# Patient Record
Sex: FEMALE | Race: OTHER | HISPANIC OR LATINO
[De-identification: names, ages, dates, MRNs, and addresses within clinical notes are randomized per-mention and may not be internally consistent; named-entity substitution may affect disease eponyms.]

---

## 2018-03-05 PROBLEM — Z00.00 ENCOUNTER FOR PREVENTIVE HEALTH EXAMINATION: Status: ACTIVE | Noted: 2018-03-05

## 2018-03-08 ENCOUNTER — APPOINTMENT (OUTPATIENT)
Dept: HEART AND VASCULAR | Facility: CLINIC | Age: 38
End: 2018-03-08
Payer: COMMERCIAL

## 2018-03-08 ENCOUNTER — APPOINTMENT (OUTPATIENT)
Dept: HEART AND VASCULAR | Facility: CLINIC | Age: 38
End: 2018-03-08

## 2018-03-08 VITALS
DIASTOLIC BLOOD PRESSURE: 60 MMHG | SYSTOLIC BLOOD PRESSURE: 118 MMHG | BODY MASS INDEX: 22.28 KG/M2 | HEART RATE: 82 BPM | WEIGHT: 147 LBS | HEIGHT: 68 IN

## 2018-03-08 DIAGNOSIS — Z34.90 ENCOUNTER FOR SUPERVISION OF NORMAL PREGNANCY, UNSPECIFIED, UNSPECIFIED TRIMESTER: ICD-10-CM

## 2018-03-08 DIAGNOSIS — R07.9 CHEST PAIN, UNSPECIFIED: ICD-10-CM

## 2018-03-08 PROCEDURE — 99205 OFFICE O/P NEW HI 60 MIN: CPT | Mod: 25

## 2018-03-08 PROCEDURE — 93000 ELECTROCARDIOGRAM COMPLETE: CPT

## 2018-03-08 PROCEDURE — 36415 COLL VENOUS BLD VENIPUNCTURE: CPT

## 2018-03-08 RX ORDER — PNV/FERROUS SULFATE/FOLIC ACID 27-<0.5MG
TABLET ORAL
Refills: 0 | Status: ACTIVE | COMMUNITY

## 2018-03-09 ENCOUNTER — APPOINTMENT (OUTPATIENT)
Dept: HEART AND VASCULAR | Facility: CLINIC | Age: 38
End: 2018-03-09
Payer: COMMERCIAL

## 2018-03-09 PROCEDURE — 93306 TTE W/DOPPLER COMPLETE: CPT

## 2018-03-12 LAB
ALBUMIN SERPL ELPH-MCNC: 4 G/DL
ALP BLD-CCNC: 50 U/L
ALT SERPL-CCNC: 13 U/L
ANION GAP SERPL CALC-SCNC: 17 MMOL/L
AST SERPL-CCNC: 19 U/L
BASOPHILS # BLD AUTO: 0.02 K/UL
BASOPHILS NFR BLD AUTO: 0.3 %
BILIRUB SERPL-MCNC: <0.2 MG/DL
BUN SERPL-MCNC: 7 MG/DL
CALCIUM SERPL-MCNC: 9.6 MG/DL
CHLORIDE SERPL-SCNC: 101 MMOL/L
CK MB BLD-MCNC: 0.5 NG/ML
CK MB CFR SERPL: 1 %
CK SERPL-CCNC: 50 U/L
CO2 SERPL-SCNC: 20 MMOL/L
CREAT SERPL-MCNC: 0.57 MG/DL
DEPRECATED D DIMER PPP IA-ACNC: 281 NG/ML DDU
EOSINOPHIL # BLD AUTO: 0.14 K/UL
EOSINOPHIL NFR BLD AUTO: 1.9 %
GLUCOSE SERPL-MCNC: 80 MG/DL
HCT VFR BLD CALC: 34 %
HGB BLD-MCNC: 11.6 G/DL
IMM GRANULOCYTES NFR BLD AUTO: 0.3 %
LYMPHOCYTES # BLD AUTO: 1.84 K/UL
LYMPHOCYTES NFR BLD AUTO: 25.2 %
MAN DIFF?: NORMAL
MCHC RBC-ENTMCNC: 31.9 PG
MCHC RBC-ENTMCNC: 34.1 GM/DL
MCV RBC AUTO: 93.4 FL
MONOCYTES # BLD AUTO: 0.63 K/UL
MONOCYTES NFR BLD AUTO: 8.6 %
NEUTROPHILS # BLD AUTO: 4.64 K/UL
NEUTROPHILS NFR BLD AUTO: 63.7 %
PLATELET # BLD AUTO: 202 K/UL
POTASSIUM SERPL-SCNC: 4.1 MMOL/L
PROT SERPL-MCNC: 7.3 G/DL
RBC # BLD: 3.64 M/UL
RBC # FLD: 13.9 %
SODIUM SERPL-SCNC: 138 MMOL/L
TSH SERPL-ACNC: 1.1 UIU/ML
WBC # FLD AUTO: 7.29 K/UL

## 2018-04-06 VITALS
RESPIRATION RATE: 16 BRPM | DIASTOLIC BLOOD PRESSURE: 65 MMHG | TEMPERATURE: 98 F | HEART RATE: 90 BPM | WEIGHT: 154.98 LBS | SYSTOLIC BLOOD PRESSURE: 106 MMHG | OXYGEN SATURATION: 99 %

## 2018-04-06 LAB
ALBUMIN SERPL ELPH-MCNC: 3.7 G/DL — SIGNIFICANT CHANGE UP (ref 3.3–5)
ALP SERPL-CCNC: 65 U/L — SIGNIFICANT CHANGE UP (ref 40–120)
ALT FLD-CCNC: 15 U/L — SIGNIFICANT CHANGE UP (ref 10–45)
ANION GAP SERPL CALC-SCNC: 10 MMOL/L — SIGNIFICANT CHANGE UP (ref 5–17)
APTT BLD: 28.4 SEC — SIGNIFICANT CHANGE UP (ref 27.5–37.4)
AST SERPL-CCNC: 20 U/L — SIGNIFICANT CHANGE UP (ref 10–40)
BASOPHILS NFR BLD AUTO: 0.2 % — SIGNIFICANT CHANGE UP (ref 0–2)
BILIRUB SERPL-MCNC: 0.2 MG/DL — SIGNIFICANT CHANGE UP (ref 0.2–1.2)
BUN SERPL-MCNC: 8 MG/DL — SIGNIFICANT CHANGE UP (ref 7–23)
CALCIUM SERPL-MCNC: 8.9 MG/DL — SIGNIFICANT CHANGE UP (ref 8.4–10.5)
CHLORIDE SERPL-SCNC: 100 MMOL/L — SIGNIFICANT CHANGE UP (ref 96–108)
CO2 SERPL-SCNC: 24 MMOL/L — SIGNIFICANT CHANGE UP (ref 22–31)
CREAT SERPL-MCNC: 0.69 MG/DL — SIGNIFICANT CHANGE UP (ref 0.5–1.3)
EOSINOPHIL NFR BLD AUTO: 2.7 % — SIGNIFICANT CHANGE UP (ref 0–6)
GLUCOSE SERPL-MCNC: 102 MG/DL — HIGH (ref 70–99)
HCT VFR BLD CALC: 33.4 % — LOW (ref 34.5–45)
HGB BLD-MCNC: 11.7 G/DL — SIGNIFICANT CHANGE UP (ref 11.5–15.5)
INR BLD: 1.04 — SIGNIFICANT CHANGE UP (ref 0.88–1.16)
LYMPHOCYTES # BLD AUTO: 24.7 % — SIGNIFICANT CHANGE UP (ref 13–44)
MCHC RBC-ENTMCNC: 32 PG — SIGNIFICANT CHANGE UP (ref 27–34)
MCHC RBC-ENTMCNC: 35 G/DL — SIGNIFICANT CHANGE UP (ref 32–36)
MCV RBC AUTO: 91.3 FL — SIGNIFICANT CHANGE UP (ref 80–100)
MONOCYTES NFR BLD AUTO: 8.5 % — SIGNIFICANT CHANGE UP (ref 2–14)
NEUTROPHILS NFR BLD AUTO: 63.9 % — SIGNIFICANT CHANGE UP (ref 43–77)
PLATELET # BLD AUTO: 216 K/UL — SIGNIFICANT CHANGE UP (ref 150–400)
POTASSIUM SERPL-MCNC: 3.7 MMOL/L — SIGNIFICANT CHANGE UP (ref 3.5–5.3)
POTASSIUM SERPL-SCNC: 3.7 MMOL/L — SIGNIFICANT CHANGE UP (ref 3.5–5.3)
PROT SERPL-MCNC: 7.4 G/DL — SIGNIFICANT CHANGE UP (ref 6–8.3)
PROTHROM AB SERPL-ACNC: 11.5 SEC — SIGNIFICANT CHANGE UP (ref 9.8–12.7)
RBC # BLD: 3.66 M/UL — LOW (ref 3.8–5.2)
RBC # FLD: 12.4 % — SIGNIFICANT CHANGE UP (ref 10.3–16.9)
SODIUM SERPL-SCNC: 134 MMOL/L — LOW (ref 135–145)
WBC # BLD: 8.2 K/UL — SIGNIFICANT CHANGE UP (ref 3.8–10.5)
WBC # FLD AUTO: 8.2 K/UL — SIGNIFICANT CHANGE UP (ref 3.8–10.5)

## 2018-04-06 PROCEDURE — 70544 MR ANGIOGRAPHY HEAD W/O DYE: CPT | Mod: 26,59

## 2018-04-06 PROCEDURE — G0427: CPT

## 2018-04-06 PROCEDURE — 70551 MRI BRAIN STEM W/O DYE: CPT | Mod: 26

## 2018-04-06 PROCEDURE — 93010 ELECTROCARDIOGRAM REPORT: CPT

## 2018-04-06 PROCEDURE — 99291 CRITICAL CARE FIRST HOUR: CPT

## 2018-04-06 PROCEDURE — 70540 MRI ORBIT/FACE/NECK W/O DYE: CPT | Mod: 26

## 2018-04-06 NOTE — ED ADULT TRIAGE NOTE - CHIEF COMPLAINT QUOTE
Pt who is 25 weeks gestation presents c/o intermittent L nostril epistaxis , states it resolves on its own.   she has been having it throughout her pregnancy and follows w hematologist. Today reports developed L face numbness and decreased hearing in her left ear as well as L eye pain. No facial droop or slurred speech.

## 2018-04-06 NOTE — ED PROVIDER NOTE - PHYSICAL EXAMINATION
CON: ao x 3, HENMT: clear oropharynx, soft neck, TM clear b/l, loss of hearing of L ear, no mastoid tenderness, no pain w/ tragus manipulation, HEAD: atraumatic, CV: rrr, equal pulses b/l, RESP: cta b/l, GI: +BS, soft, nontender, no rebound, no guarding, SKIN: no rash, MSK: no deformities, NEURO: strength 5/5 in all extremities, no aphasia or dysarthria noted, upon EOMI exam, pt reports pain w/ lateral gaze in left eye, no nystagmus noted, no dysdiadochokinesia or dysmetria w/ R extremity, noted some clumsiness of L extremity on exam, and pt states the left arm and leg feels tired w/ exam, neg pronator drift, noted dec sensation to V2 distribution on L face,

## 2018-04-06 NOTE — STROKE CODE NOTE - OBJECTIVE
on exam with Dr. paul  alert and oriented x 3  speech is clear  pupils reactive and equal  eom - when looking to the left . no nystagmus but patient has to close her eyes and has pain in left v2 distribution  Left V2 decreased sensation to LT and temp  motor  - 5/5  sensory - intact

## 2018-04-06 NOTE — CONSULT NOTE ADULT - SUBJECTIVE AND OBJECTIVE BOX
CHIEF COMPLAINT:    Patient is a 38 yo female  (currently 24 week term) with no other pmhx who is here presenting with neurologic complaints that began at 5 pm today at work. Patient reports for the past 2 weeks she has been dealing with epistaxis for the past 2 weeks. She went to see a hematologist last week and a workup for this epistaxis was started and currently pending. Of note, for the past 2 days she has noticed her epistaxis has become more frequent and has overll been feeling fatigued.  Today patient was at work when suddenly she began experiencing left sided facial pain  and numbness. Pain is described as numbing in nature and is currently constant. Symptoms then progressed to worsening pain when patient looks to her left. Patient deneis any visual loss however. symptoms then progressed to complete hearing loss of the left ear. Patient also complains of this numbing sensation/pain in her LLE.    PMHX: NONE  PsHx: none  FH: father was a smoker  Allergies: none  Meds: vitamins for pregnancy  Social: works in [employer]; denies any drus use, smoking hx, or alcohol use     INTERVAL HISTORY:      REVIEW OF SYSTEMS:  As per HPI, otherwise negative for Constitutional, Eyes, Ears/Nose/Mouth/Throat, Neck, Cardiovascular, Respiratory, Gastrointestinal, Genitourinary, Skin, Endocrine, Musculoskeletal, Psychiatric, and Hematologic/Lymphatic.    VITAL SIGNS:  Vital Signs Last 24 Hrs  T(C): 36.6 (2018 19:25), Max: 36.6 (2018 19:25)  T(F): 97.9 (2018 19:25), Max: 97.9 (2018 19:25)  HR: 90 (2018 19:25) (90 - 90)  BP: 106/65 (2018 19:25) (106/65 - 106/65)  BP(mean): --  RR: 16 (2018 19:25) (16 - 16)  SpO2: 99% (2018 19:25) (99% - 99%)    PHYSICAL EXAMINATION:  General: Well-developed, well nourished, in no acute distress.  Eyes: Conjunctiva and sclera clear.  Cardiovascular: Regular rate and rhythm; S1 and S2 Normal; No murmurs, gallops or rubs.  Neurologic:  - Mental Status:  Alert, awake, oriented to person, place, and time; Speech is fluent with intact naming, repetition, and comprehension; Immediate recall is 3/3 words and delayed recall is 3/3 words at 5 minutes; Able to spell WORLD backwards and perform serial 7 subtraction; Able to read and write a sentence; Able to copy a cube; Good overall fund of knowledge.  - Cranial Nerves II-XII:    II:  Visual acuity is 20/20 bilaterally; Visual fields are full to confronation; Fundoscopic exam is normal with sharp discs; Pupils are equal, round, and reactive to light.  III, IV, VI:  Extraocular movements are intact without nystagmus.  V:  Facial sensation is intact in the V1-V3 distribution bilaterally.  VII:  Face is symmetric with normal eye closure and smile  VIII:  Hearing is intact to finger rub.  IX, X:  Uvula is midline and soft palate rises symmetrically  XI:  Head turning and shoulder shrug are intact.  XII:  Tongue protudes in the midline.  - Motor:  Strength is 5/5 throughout.  There is no prontator drift.  Normal muscle bulk and tone throughout.  - Reflexes:  2+ and symmetric at the biceps, triceps, brachioradialis, knees, and ankles.  Plantar responses flexor.  - Sensory:  Intact to light touch, pin prick, vibration, and joint-position sense throughout.  - Coordination:  Finger-nose-finger and heel-knee-shin intact without dysmetria.  Rapid alternating hand movements intact.  - Gait:   Normal steps, base, arm swing, and turning.  Heel and toe walking are normal.  Tandem gait is normal.  Romberg testing is negative.    LABS:     .  LABS:                         11.7   8.2   )-----------( 216      ( 2018 19:59 )             33.4     04-06    134<L>  |  100  |  8   ----------------------------<  102<H>  3.7   |  24  |  0.69    Ca    8.9      2018 19:59    TPro  7.4  /  Alb  3.7  /  TBili  0.2  /  DBili  x   /  AST  20  /  ALT  15  /  AlkPhos  65  04-06    PT/INR - ( 2018 20:00 )   PT: 11.5 sec;   INR: 1.04          PTT - ( 2018 20:00 )  PTT:28.4 sec        RADIOLOGY, EKG & ADDITIONAL TESTS: Reviewed.       RADIOLOGY & ADDITIONAL STUDIES:      IMPRESSION & PLAN: CHIEF COMPLAINT:    Patient is a 38 yo female  (currently 24 week term) with no other pmhx who is here presenting with neurologic complaints that began at 5 pm today at work. Patient reports for the past 2 weeks she has been dealing with epistaxis for the past 2 weeks. She went to see a hematologist last week and a workup for this epistaxis was started and currently pending. Of note, for the past 2 days she has noticed her epistaxis has become more frequent and has overll been feeling fatigued.  Today patient was at work when suddenly she began experiencing left sided facial pain  and numbness. Pain is described as numbing in nature and is currently constant. Symptoms then progressed to worsening pain when patient looks to her left. Patient deneis any visual loss however. symptoms then progressed to complete hearing loss of the left ear. Patient also complains of this numbing sensation/pain in her LLE.    PMHX: NONE  PsHx: none  FH: father was a smoker  Allergies: none  Meds: vitamins for pregnancy  Social: works in [employer]; denies any drus use, smoking hx, or alcohol use     INTERVAL HISTORY:      REVIEW OF SYSTEMS:  As per HPI, otherwise negative for Constitutional, Eyes, Ears/Nose/Mouth/Throat, Neck, Cardiovascular, Respiratory, Gastrointestinal, Genitourinary, Skin, Endocrine, Musculoskeletal, Psychiatric, and Hematologic/Lymphatic.    VITAL SIGNS:  Vital Signs Last 24 Hrs  T(C): 36.6 (2018 19:25), Max: 36.6 (2018 19:25)  T(F): 97.9 (2018 19:25), Max: 97.9 (2018 19:25)  HR: 90 (2018 19:25) (90 - 90)  BP: 106/65 (2018 19:25) (106/65 - 106/65)  BP(mean): --  RR: 16 (2018 19:25) (16 - 16)  SpO2: 99% (2018 19:25) (99% - 99%)    PHYSICAL EXAMINATION:  General: Well-developed, well nourished, in no acute distress. anxious   Eyes: Conjunctiva and sclera clear.  Cardiovascular: Regular rate and rhythm; S1 and S2 Normal; No murmurs, gallops or rubs.  Neurologic:  - Mental Status:  Alert, awake, oriented to person, place, and time; Speech is fluent with intact naming, repetition, and comprehension;   - Cranial Nerves II-XII:    II:  ; Visual fields are full to confronation;  Pupils are equal, round, and reactive to light.  III, IV, VI:  Extraocular movements are intact without nystagmus. however patient with difficulty turning eyes to the left secondary to pain  V:  decreased sensation noted int he V2 region especially to sharp and temperature change  VII:  Face is symmetric with normal eye closure and smile  VIII:  decreased hearing noteed in the left ear   IX, X:  Uvula is midline and soft palate rises symmetrically  XI:  Head turning and shoulder shrug are intact.  XII:  Tongue protudes in the midline.  - Motor:  Strength is 5/5 throughout.  There is no prontator drift.  Normal muscle bulk and tone throughout.  - Reflexes:  2+ and symmetric at the biceps,brachioradialis, knees, and ankles.  Plantar responses flexor.  - Sensory: decreased sensation noted in the facial region noted above and in the lower left extremity   - Coordination:  Finger-nose-finger and heel-knee-shin intact without dysmetria.  Rapid alternating hand movements intact.  - Gait:   Normal steps, base, arm swing, and turning.    LABS:     .  LABS:                         11.7   8.2   )-----------( 216      ( 2018 19:59 )             33.4     04-06    134<L>  |  100  |  8   ----------------------------<  102<H>  3.7   |  24  |  0.69    Ca    8.9      2018 19:59    TPro  7.4  /  Alb  3.7  /  TBili  0.2  /  DBili  x   /  AST  20  /  ALT  15  /  AlkPhos  65  04-06    PT/INR - ( 2018 20:00 )   PT: 11.5 sec;   INR: 1.04          PTT - ( 2018 20:00 )  PTT:28.4 sec        RADIOLOGY, EKG & ADDITIONAL TESTS: Reviewed.       RADIOLOGY & ADDITIONAL STUDIES:      IMPRESSION & PLAN: CHIEF COMPLAINT:    Patient is a 36 yo female  (currently 24 week term) with no other pmhx who is here presenting with neurologic complaints that began at 5 pm today at work. Patient reports for the past 2 weeks she has been dealing with epistaxis from her left nostril  for the past 2 weeks. She went to see a hematologist last week and a workup for this epistaxis was started and currently pending. reports she typically has 4 episodes in a week which usually soak her tissue. Of note, for the past 2 days she has noticed her epistaxis has become more frequent and has overal been feeling fatigued.  Today patient was at work when suddenly she began experiencing left sided facial pain/numbness. Pain is described as numbing in nature and is currently constant. Symptoms then progressed to worsening left temple pain when patient looks to her left with her gaze. Patient denies any visual loss however. Symptoms then progressed to complete hearing loss of the left ear. Patient also complains of this numbing sensation/pain in her LLE. Currently denies any headaches, blurry vision, prodromal symptoms, dysphagia, SOB, CP, abdominal pain, N/V/D or weakness.     Patient has recently see her GYN doctor and her fetus was progressing well. This is the patients 2nd pregnancy (1st-4 years ago uneventful and no miscarriages.     PMHX: NONE  PsHx: none  FH: father was a smoker  Allergies: none  Meds: vitamins for pregnancy  Social: works in [employer]; denies any drus use, smoking hx, or alcohol use    REVIEW OF SYSTEMS:  see above    VITAL SIGNS:  Vital Signs Last 24 Hrs  T(C): 36.6 (2018 19:25), Max: 36.6 (2018 19:25)  T(F): 97.9 (2018 19:25), Max: 97.9 (2018 19:25)  HR: 90 (2018 19:25) (90 - 90)  BP: 106/65 (2018 19:25) (106/65 - 106/65)  BP(mean): --  RR: 16 (2018 19:25) (16 - 16)  SpO2: 99% (2018 19:25) (99% - 99%)    PHYSICAL EXAMINATION:  General: Well-developed, well nourished, in no acute distress. anxious   Eyes: Conjunctiva and sclera clear.  Cardiovascular: Regular rate and rhythm; S1 and S2 Normal; No murmurs, gallops or rubs.  Neurologic:  - Mental Status:  Alert, awake, oriented to person, place, and time; Speech is fluent with intact naming, repetition, and comprehension;   - Cranial Nerves II-XII:    II:  ; Visual fields are full to confrontation  Pupils are equal, round, and reactive to light.  III, IV, VI:  Extraocular movements are intact without nystagmus. however patient with difficulty turning eyes to the left secondary to pain  V:  decreased sensation noted int he V2 region especially to sharp and temperature change  VII:  Face is symmetric with normal eye closure and smile  VIII:  decreased hearing noted in the left ear   IX, X:  Uvula is midline and soft palate rises symmetrically  XI:  Head turning and shoulder shrug are intact.  XII:  Tongue protudes in the midline.  - Motor:  Strength is 5/5 throughout.  There is no prontator drift.  Normal muscle bulk and tone throughout.  - Reflexes:  2+ and symmetric at the biceps, brachioradialis knees, and ankles.  Plantar responses flexor.  - Sensory: decreased sensation noted in the facial region noted above and in the lower left extremity   - Coordination:  Finger-nose-finger and heel-knee-shin intact without dysmetria.  Rapid alternating hand movements intact.  - Gait:   Normal steps, base, arm swing, and turning.     .  LABS:                         11.7   8.2   )-----------( 216      ( 2018 19:59 )             33.4     04-06    134<L>  |  100  |  8   ----------------------------<  102<H>  3.7   |  24  |  0.69    Ca    8.9      2018 19:59    TPro  7.4  /  Alb  3.7  /  TBili  0.2  /  DBili  x   /  AST  20  /  ALT  15  /  AlkPhos  65  04-06    PT/INR - ( 2018 20:00 )   PT: 11.5 sec;   INR: 1.04          PTT - ( 2018 20:00 )  PTT:28.4 sec        RADIOLOGY, EKG & ADDITIONAL TESTS: MRI pending     CT deferred since patient is pregnant       IMPRESSION & PLAN: CHIEF COMPLAINT:    Patient is a 38 yo female  (currently 24 week term) with no other pmhx who is here presenting with neurologic complaints that began at 5 pm today at work. Patient reports for the past 2 weeks she has been dealing with epistaxis from her left nostril  for the past 2 weeks. She went to see a hematologist last week and a workup for this epistaxis was started and currently pending. reports she typically has 4 episodes in a week which usually soak her tissue. Of note, for the past 2 days she has noticed her epistaxis has become more frequent and has overal been feeling fatigued.  Today patient was at work when suddenly she began experiencing left sided facial pain/numbness. Pain is described as numbing in nature and is currently constant. Symptoms then progressed to worsening left temple pain when patient looks to her left with her gaze. Patient denies any visual loss however. Symptoms then progressed to complete hearing loss of the left ear. Patient also complains of this numbing sensation/pain in her LLE. Currently denies any headaches, blurry vision, prodromal symptoms, dysphagia, SOB, CP, abdominal pain, N/V/D or weakness.     Patient has recently see her GYN doctor and her fetus was progressing well. This is the patients 2nd pregnancy (1st-4 years ago uneventful and no miscarriages.     PMHX: NONE  PsHx: none  FH: father was a smoker  Allergies: none  Meds: vitamins for pregnancy  Social: works in [employer]; denies any drus use, smoking hx, or alcohol use    REVIEW OF SYSTEMS:  see above    VITAL SIGNS:  Vital Signs Last 24 Hrs  T(C): 36.6 (2018 19:25), Max: 36.6 (2018 19:25)  T(F): 97.9 (2018 19:25), Max: 97.9 (2018 19:25)  HR: 90 (2018 19:25) (90 - 90)  BP: 106/65 (2018 19:25) (106/65 - 106/65)  BP(mean): --  RR: 16 (2018 19:25) (16 - 16)  SpO2: 99% (2018 19:25) (99% - 99%)    PHYSICAL EXAMINATION:  General: Well-developed, well nourished, in no acute distress. anxious   Eyes: Conjunctiva and sclera clear.  Cardiovascular: Regular rate and rhythm; S1 and S2 Normal; No murmurs, gallops or rubs.  Neurologic:  - Mental Status:  Alert, awake, oriented to person, place, and time; Speech is fluent with intact naming, repetition, and comprehension;   - Cranial Nerves II-XII:    II:  ; Visual fields are full to confrontation  Pupils are equal, round, and reactive to light.  III, IV, VI:  Extraocular movements are intact without nystagmus. however patient with difficulty turning eyes to the left secondary to pain  V:  decreased sensation noted in t he V2 region especially to sharp and temperature change  VII:  Face is symmetric with normal eye closure and smile  VIII:  decreased hearing noted in the left ear   IX, X:  Uvula is midline and soft palate rises symmetrically  XI:  Head turning and shoulder shrug are intact.  XII:  Tongue protudes in the midline.  - Motor:  Strength is 5/5 throughout.  There is no prontator drift.  Normal muscle bulk and tone throughout.  - Reflexes:  2+ and symmetric at the biceps, brachioradialis knees, and ankles.  Plantar responses flexor.  - Sensory: decreased sensation noted in the facial region noted above and in the lower left extremity   - Coordination:  Finger-nose-finger and heel-knee-shin intact without dysmetria.  Rapid alternating hand movements intact.  - Gait:   Normal steps, base, arm swing, and turning.     .  LABS:                         11.7   8.2   )-----------( 216      ( 2018 19:59 )             33.4     04-06    134<L>  |  100  |  8   ----------------------------<  102<H>  3.7   |  24  |  0.69    Ca    8.9      2018 19:59    TPro  7.4  /  Alb  3.7  /  TBili  0.2  /  DBili  x   /  AST  20  /  ALT  15  /  AlkPhos  65  04-06    PT/INR - ( 2018 20:00 )   PT: 11.5 sec;   INR: 1.04          PTT - ( 2018 20:00 )  PTT:28.4 sec        RADIOLOGY, EKG & ADDITIONAL TESTS: MRI pending     CT deferred since patient is pregnant       IMPRESSION & PLAN:

## 2018-04-06 NOTE — STROKE CODE NOTE - SUBJECTIVE
acute onset of left hearing loss and pain when she looks to the left. left nostril bleeding x 1 week.   patient is in 2nd trimester of her second pregnancy  MRI brain and MRI orbits  - both without contrast negative for sinus thrombosis or mass in orbits. MRV is negative.

## 2018-04-06 NOTE — ED ADULT NURSE REASSESSMENT NOTE - NS ED NURSE REASSESS COMMENT FT1
PT endorsed from RAKESH Hoffmann, received during transport to MRI. PT is , 25 weeks pregnant presents with left sided numbness with difficulty hearing since . PT brought to MRI at 19:50. On MRI table at 20:17. RAKESH Davis accompanied. PT brought to [facility] for telestroke consultation. VS stable. Will continue to monitor.

## 2018-04-06 NOTE — CONSULT NOTE ADULT - ASSESSMENT
Patient is a 36 yo female  (currently 24 week term) with no other pmhx who is here presenting with neurologic complaints that began at 5 pm today at work.       #Neur Patient is a 38 yo female  (currently 24 week term) with no other pmhx who is here presenting with neurologic complaints that began at 5 pm today at work.       # DDX includes cavernous sinus thrombosis vs. tumor mass affect vs. ischemia a(although less likely) Patient is a 36 yo female  (currently 24 week term) with no other pmhx who is here presenting with neurologic complaints that began at 5 pm today at work.       # Numbness/paresthias in the left V2 CN region with associated left gaze pain and diminished hearing loss on the left; Sudden onset at 5 pm today;  DDX includes cavernous sinus thrombosis vs. tumor mass affect vs. ischemia (although less likely) vs. inflammatory etiology (such as MS); atypical migraine also on differential; consider cavernous-carotid fistula in setting of epistaxis and neurologic findings although lower on differential	  -- f/u result of MRI head   -- please obtain MRV to look for sinus thrombosis especially in setting of hypercoagulable state (ie pregnancy)   -- neuro checks Q1 hr for now  -- will admit to [facility] for workup.

## 2018-04-06 NOTE — ED ADULT NURSE NOTE - OBJECTIVE STATEMENT
25wks pregnant c/o left facial numbness and left ear pain that started today.  A&Ox4, speaking clearly and coherently in full sentences, steady gait, no weakness, denies abdominal pain, vaginal bleeding, dizziness.  MD at bedside.  Vital signs stable. 25wks pregnant c/o left facial numbness and left blurry vision, left hearing loss that started today.  A&Ox4, speaking clearly and coherently in full sentences, steady gait, no weakness, denies abdominal pain, vaginal bleeding, dizziness.  MD at bedside.  Vital signs stable.

## 2018-04-06 NOTE — ED CLERICAL - NS ED CLERK NOTE PRE-ARRIVAL INFORMATION; ADDITIONAL PRE-ARRIVAL INFORMATION
Jennifer Causey 38 yo [birth year] 25 weeks pregnant with nose bleed ear pain and facial numbess  Dr Mcclain (DR. BAILEY)

## 2018-04-06 NOTE — ED PROVIDER NOTE - MEDICAL DECISION MAKING DETAILS
pt evaluated immediately, after exam, code stroke activated and d/w stroke team, will take pt to MRI instead of CT

## 2018-04-06 NOTE — ED ADULT NURSE NOTE - CHPI ED SYMPTOMS NEG
no confusion/no dizziness/no blurred vision/no fever/no change in level of consciousness/no nausea/no numbness/no loss of consciousness/no weakness/no vomiting

## 2018-04-06 NOTE — STROKE CODE NOTE - ASSESSMENT/PLAN
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

## 2018-04-06 NOTE — ED PROVIDER NOTE - PROGRESS NOTE DETAILS
MRI and MRV done and reviewed, d/w OB and stroke team together, will admit to [facility], OB will consult, low dose steroids, inpatient team will obtain ENT consult in the AM for hearing loss

## 2018-04-06 NOTE — ED PROVIDER NOTE - OBJECTIVE STATEMENT
37 yof pw L facial numbness onset around 3pm at work, no preceding event, also w/ loss of hearing to L ear.  no fc, no cough, no HA, no blurry vision, no unsteadiness, no vomiting, no hx of migraine.   25wk GA, also having epistaxis x 2 wk, seen by hematologist last week, pending result, no hx of hypercoagulable state, no hx of bleeding diathesis.  denies bruising or bleeding elsewhere.

## 2018-04-07 ENCOUNTER — INPATIENT (INPATIENT)
Facility: HOSPITAL | Age: 38
LOS: 0 days | Discharge: HOME | DRG: 781 | End: 2018-04-08
Attending: PSYCHIATRY & NEUROLOGY | Admitting: PSYCHIATRY & NEUROLOGY
Payer: COMMERCIAL

## 2018-04-07 DIAGNOSIS — Z29.9 ENCOUNTER FOR PROPHYLACTIC MEASURES, UNSPECIFIED: ICD-10-CM

## 2018-04-07 DIAGNOSIS — R20.9 UNSPECIFIED DISTURBANCES OF SKIN SENSATION: ICD-10-CM

## 2018-04-07 DIAGNOSIS — Z34.90 ENCOUNTER FOR SUPERVISION OF NORMAL PREGNANCY, UNSPECIFIED, UNSPECIFIED TRIMESTER: ICD-10-CM

## 2018-04-07 DIAGNOSIS — R04.0 EPISTAXIS: ICD-10-CM

## 2018-04-07 LAB
CRP SERPL-MCNC: 0.92 MG/DL — HIGH (ref 0–0.4)
ERYTHROCYTE [SEDIMENTATION RATE] IN BLOOD: 66 MM/HR — HIGH

## 2018-04-07 PROCEDURE — 99223 1ST HOSP IP/OBS HIGH 75: CPT

## 2018-04-07 RX ORDER — AMOXICILLIN 250 MG/5ML
500 SUSPENSION, RECONSTITUTED, ORAL (ML) ORAL THREE TIMES A DAY
Qty: 0 | Refills: 0 | Status: DISCONTINUED | OUTPATIENT
Start: 2018-04-07 | End: 2018-04-08

## 2018-04-07 RX ORDER — SODIUM CHLORIDE 0.65 %
1 AEROSOL, SPRAY (ML) NASAL THREE TIMES A DAY
Qty: 0 | Refills: 0 | Status: DISCONTINUED | OUTPATIENT
Start: 2018-04-07 | End: 2018-04-08

## 2018-04-07 RX ADMIN — Medication 20 MILLIGRAM(S): at 15:40

## 2018-04-07 RX ADMIN — Medication 20 MILLIGRAM(S): at 02:27

## 2018-04-07 RX ADMIN — Medication 1 TABLET(S): at 15:40

## 2018-04-07 NOTE — H&P ADULT - NSHPPHYSICALEXAM_GEN_ALL_CORE
PHYSICAL EXAMINATION:  General: Well-developed, well nourished, in no acute distress. anxious   Eyes: Conjunctiva and sclera clear.  Cardiovascular: Regular rate and rhythm; S1 and S2 Normal; No murmurs, gallops or rubs.  Pulm: CTA B/L  Abdomen: distended due to fetus, NT,   Neurologic:  - Mental Status:  Alert, awake, oriented to person, place, and time; Speech is fluent with intact naming, repetition, and comprehension;   - Cranial Nerves II-XII:    II:  ; Visual fields are full to confrontation  Pupils are equal, round, and reactive to light.  III, IV, VI:  Extraocular movements are intact without nystagmus. however patient with difficulty turning eyes to the left secondary to pain  V:  decreased sensation noted in t he V2 region especially to sharp and temperature change  VII:  Face is symmetric with normal eye closure and smile  VIII:  decreased hearing noted in the left ear   IX, X:  Uvula is midline and soft palate rises symmetrically  XI:  Head turning and shoulder shrug are intact.  XII:  Tongue protudes in the midline.  - Motor:  Strength is 5/5 throughout.  There is no prontator drift.  Normal muscle bulk and tone throughout.  - Reflexes:  2+ and symmetric at the biceps, brachioradialis knees, and ankles.  Plantar responses flexor.  - Sensory: decreased sensation noted in the facial region noted above and in the lower left extremity   - Coordination:  Finger-nose-finger and heel-knee-shin intact without dysmetria.  Rapid alternating hand movements intact.  - Gait:   Normal steps, base, arm swing, and turning. PHYSICAL EXAMINATION:  General: Well-developed, well nourished, in no acute distress. anxious   Head: facial pain noted on palpation in the left frontal and maxillary region   Eyes: Conjunctiva and sclera clear.  Ear: left ear clear with intact tympanic membranes and normal light reflex   Cardiovascular: Regular rate and rhythm; S1 and S2 Normal; No murmurs, gallops or rubs.  Pulm: CTA B/L  Abdomen: distended due to fetus, NT,   Neurologic:  - Mental Status:  Alert, awake, oriented to person, place, and time; Speech is fluent with intact naming, repetition, and comprehension;   - Cranial Nerves II-XII:    II:  ; Visual fields are full to confrontation  Pupils are equal, round, and reactive to light.  III, IV, VI:  Extraocular movements are intact without nystagmus. however patient with difficulty turning eyes to the left secondary to pain  V:  decreased sensation noted in the V2 region especially to sharp and temperature change  VII:  Face is symmetric with normal eye closure and smile  VIII:  decreased hearing noted in the left ear   IX, X:  Uvula is midline and soft palate rises symmetrically  XI:  Head turning and shoulder shrug are intact.  XII:  Tongue protrudes in the midline.  - Motor:  Strength is 5/5 throughout.  There is no prontator drift.  Normal muscle bulk and tone throughout.  - Reflexes:  2+ and symmetric at the biceps, brachioradialis knees, and ankles.  Plantar responses flexor.  - Sensory: decreased sensation noted in the facial region noted above and in the lower left extremity   - Coordination:  Finger-nose-finger and heel-knee-shin intact without dysmetria.  Rapid alternating hand movements intact.  - Gait:   Normal steps, base, arm swing, and turning. PHYSICAL EXAMINATION:  General: Well-developed, well nourished, in no acute distress. anxious   Head: facial pain noted on palpation in the left frontal and maxillary region   Eyes: Conjunctiva and sclera clear.  Ear: *left ear clear with intact tympanic membranes and normal light reflex as per ED attending*  Cardiovascular: Regular rate and rhythm; S1 and S2 Normal; No murmurs, gallops or rubs.  Pulm: CTA B/L  Abdomen: distended due to fetus, NT,   Neurologic:  - Mental Status:  Alert, awake, oriented to person, place, and time; Speech is fluent with intact naming, repetition, and comprehension;   - Cranial Nerves II-XII:    II:  ; Visual fields are full to confrontation  Pupils are equal, round, and reactive to light.  III, IV, VI:  Extraocular movements are intact without nystagmus. however patient with difficulty turning eyes to the left secondary to pain  V:  decreased sensation noted in the V2 region especially to sharp and temperature change  VII:  Face is symmetric with normal eye closure and smile  VIII:  decreased hearing noted in the left ear   IX, X:  Uvula is midline and soft palate rises symmetrically  XI:  Head turning and shoulder shrug are intact.  XII:  Tongue protrudes in the midline.  - Motor:  Strength is 5/5 throughout.  There is no prontator drift.  Normal muscle bulk and tone throughout.  - Reflexes:  2+ and symmetric at the biceps, brachioradialis knees, and ankles.  Plantar responses flexor.  - Sensory: decreased sensation noted in the facial region noted above and in the lower left extremity   - Coordination:  Finger-nose-finger and heel-knee-shin intact without dysmetria.  Rapid alternating hand movements intact.  - Gait:   Normal steps, base, arm swing, and turning.

## 2018-04-07 NOTE — H&P ADULT - ASSESSMENT
Patient is a 38 yo female  (currently 24 week term) with no other pmhx who is here presenting with neurologic complaints that began at 5 pm today at work.

## 2018-04-07 NOTE — H&P ADULT - PROBLEM SELECTOR PLAN 3
Patient reports epistaxis for the past 2 weeks that became more frequent the past 2 days; as per patient soaks the tissue; currently not bleeding with H/H WNL   -- will consult ENT in am- Patient reports epistaxis for the past 2 weeks that became more frequent the past 2 days; as per patient soaks the tissue; currently not bleeding with H/H WNL; unable to perform exam due to lack of otoscope however ED attending endorsed tympanic membrane is intact with light reflex and no bulging noted   -- will consult ENT in am-  -- locate otoscope to perform exam

## 2018-04-07 NOTE — H&P ADULT - ATTENDING COMMENTS
patient seen with resident and case discussed with ENT.   hearing and pain is much better with steroids. Thought to be due to inflammatory disease of naso and oropharynx. will start abx and observe for another day. if improved will dc in am

## 2018-04-07 NOTE — PROGRESS NOTE ADULT - PROBLEM SELECTOR PLAN 1
1. Neurology: appreciate care, awaiting ENT consult  2. No acute interventions from OB at this time, plan for fetal heart check daily as well as BPP  3. Continue to monitor

## 2018-04-07 NOTE — H&P ADULT - HISTORY OF PRESENT ILLNESS
Patient is a 38 yo female  (currently 24 week term) with no other pmhx who is here presenting with neurologic complaints that began at 5 pm today at work. Patient reports for the past 2 weeks she has been dealing with epistaxis from her left nostril  for the past 2 weeks. She went to see a hematologist last week and a workup for this epistaxis was started and currently pending. reports she typically has 4 episodes in a week which usually soak her tissue. Of note, for the past 2 days she has noticed her epistaxis has become more frequent and has overal been feeling fatigued.  Today patient was at work when suddenly she began experiencing left sided facial pain/numbness. Pain is described as numbing in nature and is currently constant. Symptoms then progressed to worsening left temple pain when patient looks to her left with her gaze. Patient denies any visual loss however. Symptoms then progressed to complete hearing loss of the left ear. Patient also complains of this numbing sensation/pain in her LLE. Currently denies any headaches, blurry vision, prodromal symptoms, dysphagia, SOB, CP, abdominal pain, N/V/D or weakness. Patient is a 36 yo female  (currently 24 week term) with no other pmhx who is here presenting with neurologic complaints that began at 5 pm today at work. Patient reports for the past 2 weeks she has been dealing with epistaxis from her left nostril  . She went to see a hematologist last week and a workup for this epistaxis was started and currently pending. reports she typically has 4 episodes in a week which usually soak her tissue. Of note, for the past 2 days she has noticed her epistaxis has become more frequent and has overal been feeling fatigued.  Today patient was at work when suddenly she began experiencing left sided facial pain/numbness. Pain is described as numbing in nature and is currently constant. Symptoms then progressed to worsening left temple pain when patient looks to her left with her gaze. Patient denies any visual loss however. Symptoms then progressed to complete hearing loss of the left ear. Patient also complains of this numbing sensation/pain in her LLE. Currently denies any headaches, blurry vision, prodromal symptoms, dysphagia, SOB, CP, abdominal pain, N/V/D or weakness.

## 2018-04-07 NOTE — H&P ADULT - NSHPLABSRESULTS_GEN_ALL_CORE
.  LABS:                         11.7   8.2   )-----------( 216      ( 06 Apr 2018 19:59 )             33.4     04-06    134<L>  |  100  |  8   ----------------------------<  102<H>  3.7   |  24  |  0.69    Ca    8.9      06 Apr 2018 19:59    TPro  7.4  /  Alb  3.7  /  TBili  0.2  /  DBili  x   /  AST  20  /  ALT  15  /  AlkPhos  65  04-06    PT/INR - ( 06 Apr 2018 20:00 )   PT: 11.5 sec;   INR: 1.04          PTT - ( 06 Apr 2018 20:00 )  PTT:28.4 sec              RADIOLOGY, EKG & ADDITIONAL TESTS:   MR Head No Cont (04.06.18 @ 20:56) >    IMPRESSION:    No evidence of acute infarction or other acute abnormality.      MR Venogram Head No Cont (04.06.18 @ 23:24) >    IMPRESSION: Normal MRV examination of the brain.    < end of copied text >

## 2018-04-07 NOTE — CONSULT NOTE ADULT - ASSESSMENT
38yo  at 26w2d presents for new onset facial numbness and left hearing loss concerning for venous thrombosis vs bell's palsy vs. sinusitis. Reassuring OB ultrasound. From Gyn standpoint, no interventions at this time. Gyn will continue to follow with daily fetal BPPs and FHR check BID.    Plan discussed with Dr. Trinh

## 2018-04-07 NOTE — H&P ADULT - PROBLEM SELECTOR PLAN 1
Numbness/ paresthesia in the left V2 CN region with associated left gaze pain and diminished hearing loss on the left; Sudden onset at 5 pm today; MRI head and MRV performed that were unremarkable for any thrombosis or structural defect; unclear etiology DDX includes possible inflammatory etiology; atypical migraine also on differential;   -- will start prednisone 20 mg QD for hearing loss (possible inflammatory component)   -- neuro checks Q2 hr for now  -- will admit to [facility] for workup.  -- will send autoimmune workup which includes ESR, CRP, LAUREN, antiphospholipid anti cardiolipin, Anti-double Dna, Numbness/ paresthesia in the left V2 CN region with associated left gaze pain and diminished hearing loss on the left; Sudden onset at 5 pm today; MRI head and MRV performed that were unremarkable for any thrombosis or structural defect; unclear etiology DDX includes possible inflammatory etiology; atypical migraine also on differential;   -- will start prednisone 20 mg QD for hearing loss (possible inflammatory component)   -- neuro checks Q2 hr for now  -- will admit to [facility] for workup.  -- will send autoimmune workup which includes ESR, CRP, LAUREN, antiphospholipid anti cardiolipin, Anti-double Dna

## 2018-04-07 NOTE — CONSULT NOTE ADULT - SUBJECTIVE AND OBJECTIVE BOX
HPI: 37y pregnant Female with acute onset L facial numbness in V1-2 distribution. pain with L lateral gaze and L HL that began yesterday at 5 pm. Pt had MRI wnl. given 20mg of prednisone x 2 doses. facial numbness and lateral gaze pain resolved. Still with L HL but has improved slightly. No other acute complaints. In the setting of recurrent, intermittent, self resolving L sided epistaxis over the past 2 weeks that pt sought care of a hematologist.  Elevated ESR to 66. no white count (WBC 8.2). afebrile.     Allergies    No Known Allergies            PAST MEDICAL & SURGICAL HISTORY:  No pertinent past medical history      IV fluids:  prenatal multivitamin 1 Tablet(s) Oral daily      Endocrine Medications:       All other standing medications:       All other PRN medications:  sodium chloride 0.65% Nasal 1 Spray(s) Both Nostrils three times a day PRN      Vital Signs Last 24 Hrs  T(C): 37.2 (07 Apr 2018 18:08), Max: 37.3 (07 Apr 2018 14:08)  T(F): 99 (07 Apr 2018 18:08), Max: 99.2 (07 Apr 2018 14:08)  HR: 88 (07 Apr 2018 18:35) (85 - 108)  BP: 105/59 (07 Apr 2018 18:35) (99/56 - 125/67)  BP(mean): 65 (07 Apr 2018 18:35) (65 - 83)  RR: 14 (07 Apr 2018 18:35) (14 - 22)  SpO2: 97% (07 Apr 2018 18:35) (97% - 98%)    LABS:  CBC-    04-06    134<L>  |  100  |  8   ----------------------------<  102<H>  3.7   |  24  |  0.69    Ca    8.9      06 Apr 2018 19:59    TPro  7.4  /  Alb  3.7  /  TBili  0.2  /  DBili  x   /  AST  20  /  ALT  15  /  AlkPhos  65  04-06    Coagulation Studies-  PT/INR - ( 06 Apr 2018 20:00 )   PT: 11.5 sec;   INR: 1.04          PTT - ( 06 Apr 2018 20:00 )  PTT:28.4 sec  Endocrine Panel-  --  --  8.9 mg/dL        PHYSICAL EXAM:  NAD  non-labored breathing, no stridor or dyspnea  MMM, no mucosal lesions or erythema  Dry nasal mucosa, middle meatus clear  Neck soft, flat  AD: EAC clear, TM intact, clear no effusion or retraction  AS: EAC clear, TM intact, serous effusion noted  Bone to L  AC>BC b/l    LARYNGOSCOPY EXAM:     -Verbal consent was obtained from patient prior to procedure.    Indication:    Anesthesia: Afrin spray was applied to the nasal cavities.    Flexible laryngoscopy was performed and revealed the following:    -- Nasopharynx had no mass or exudate. prominent adenoid tissue, No masses or obstruction    -- Base of tongue was symmetric and not enlarged.    -- Vallecula was clear    -- Epiglottis, both aryepiglottic folds and both false vocal folds were normal    -- Arytenoids both without edema and erythema     -- True vocal folds were fully mobile and without lesions.     -- Post cricoid area was clear.    -- Interarytenoid edema was absent    The patient tolerated the procedure well.              Assessment/Plan:  37y Female with L ear effusion  - steroids per primary team  - amoxicillin x 5d if okay by OB/GYN  - afrin BID x 5 days for Eustachian tube dysfunction  - Nasal saline BID indefinitely for dry nasal mucosa    d/w attending             [facility] at [phone] with any questions/concerns.

## 2018-04-07 NOTE — H&P ADULT - PROBLEM SELECTOR PLAN 2
in her 24th week; Dr Britton is her Obstetrician as an outpatient   -- OB consulted and will follow patient daily  -- PLEASE RUN ALL NEW MEDICATIONS WITH OB TEAM

## 2018-04-07 NOTE — CONSULT NOTE ADULT - SUBJECTIVE AND OBJECTIVE BOX
36yo  at 26w2d by first trimester sono presents for new onset left sided hearing loss, facial numbness and pain. Patient reports she started having nose bleeds the past 2 weeks. She is being worked up by hematology for a bleeding disorder. This afternoon she noticed complete hearing loss in her left ear. It progessed to numbness and pain around her left cheek and eye. She denies blurred vision or visual changes. She reports other than bloody noses, pregnancy has been uncomplicated. She denies contractions, LOF, or VB. endorses normal fetal movement.    In ED, Pt received MRI/MRV to r/o venous sinus thrombosis, imaging WNL.    ObHx:   -   GynHx: no h/o abnormal paps  PMH: denies  PSH: denies  Meds: PNV  All: NKDA    1ICU Vital Signs Last 24 Hrs  T(C): 36.7 (2018 00:13), Max: 36.7 (2018 21:04)  T(F): 98 (2018 00:13), Max: 98 (2018 21:04)  HR: 87 (2018 00:13) (60 - 90)  BP: 104/65 (2018 00:13) (104/60 - 106/65)  RR: 16 (2018 00:13) (16 - 16)  SpO2: 98% (2018 00:13) (98% - 99%)  Gen: NAD  Neuro: reduced sensation of light touch to left periorbital region compared to right. no facial drooping or asymmetry of face appreciated  Card: RRR, no m/r/g  pulm: CTAB no crackles or wheezes  Abd: gravid, nontender  Ext: no calf tenderness bilaterally                          11.7   8.2   )-----------( 216      ( 2018 19:59 )             33.4   04-06    134<L>  |  100  |  8   ----------------------------<  102<H>  3.7   |  24  |  0.69    Ca    8.9      2018 19:59    TPro  7.4  /  Alb  3.7  /  TBili  0.2  /  DBili  x   /  AST  20  /  ALT  15  /  AlkPhos  65  04-06  PT/INR - ( 2018 20:00 )   PT: 11.5 sec;   INR: 1.04          PTT - ( 2018 20:00 )  PTT:28.4 sec    bedside TAUS: FHR 136bpm, +FM, anterior placenta      INTERPRETATION:  CLINICAL STATEMENT: Left facial paresthesia. Lateral   gaze ophthalmoplegia.    TECHNIQUE: An MRI of the brain was performed without contrast using the   following sequences: Sagittal T1, coronal FLAIR, axial T1, axial   diffusion, axial T2, axial FLAIR, and axial susceptibility weighted   imaging.    COMPARISON: None available.    FINDINGS:     The ventricles and sulci are normal in size and configuration. No   significant abnormality is seen in the brain parenchyma. There is no   midline shift or mass effect.     There is no restricted diffusion to suggest acute infarction.   Susceptibility weighted sequence is within normal limits. There is no   intracranial hemorrhage or mass lesion. Intracranial flow voids are   preserved.    There is mucosal thickening of the paranasal sinuses, more pronounced in   the ethmoid sinuses. The mastoid air cells are clear. The bone marrow   signal is normal.    IMPRESSION:    No evidence of acute infarction or other acute abnormality.        INTERPRETATION:  PROCEDURE: MRV of the brain without contrast    INDICATION: Left facial paresthesia.    TECHNIQUE: The MRV was performed utilizing 2 D TOF technique.    COMPARISON: Brain MRI performed earlier the same day.    FINDINGS: The MRV exam demonstrates normal signal within the superior   sagittal, transverse and sigmoid sinus extending into the jugular veins   bilaterally. There is no filling defect to suggest venous thrombosis.   Please note that the cavernous sinuses are suboptimally evaluated on MR   venography.    IMPRESSION: Normal MRV examination of the brain.

## 2018-04-07 NOTE — CONSULT NOTE ADULT - ATTENDING COMMENTS
Patient seen and examined at bedside with resident. Agree with above evaluation. Appreciate Neurology work up. OB team to follow, no obstetric issues at this point.

## 2018-04-08 ENCOUNTER — TRANSCRIPTION ENCOUNTER (OUTPATIENT)
Age: 38
End: 2018-04-08

## 2018-04-08 VITALS — TEMPERATURE: 98 F

## 2018-04-08 LAB — DSDNA AB SER-ACNC: <12 IU/ML — SIGNIFICANT CHANGE UP

## 2018-04-08 PROCEDURE — 99238 HOSP IP/OBS DSCHRG MGMT 30/<: CPT

## 2018-04-08 RX ORDER — AMOXICILLIN 250 MG/5ML
1 SUSPENSION, RECONSTITUTED, ORAL (ML) ORAL
Qty: 12 | Refills: 0 | OUTPATIENT
Start: 2018-04-08 | End: 2018-04-11

## 2018-04-08 RX ORDER — SODIUM CHLORIDE 0.65 %
1 AEROSOL, SPRAY (ML) NASAL
Qty: 1 | Refills: 0 | OUTPATIENT
Start: 2018-04-08 | End: 2018-05-07

## 2018-04-08 RX ADMIN — Medication 500 MILLIGRAM(S): at 07:07

## 2018-04-08 RX ADMIN — Medication 500 MILLIGRAM(S): at 00:10

## 2018-04-08 RX ADMIN — Medication 1 TABLET(S): at 11:52

## 2018-04-08 NOTE — DISCHARGE NOTE ADULT - PLAN OF CARE
Improve symptoms You were found to have epistaxis, facial numbness and hearing loss. MRI head and MRV performed that were unremarkable for any thrombosis or structural defect. Given 20mg of prednisone x 2 doses. Facial numbness and lateral gaze pain resolved. Flexible laryngoscopy was performed and revealed the following: Nasopharynx had no mass or exudate. prominent adenoid tissue, No masses or obstruction. Base of tongue was symmetric and not enlarged. Vallecula was clear. Epiglottis, both aryepiglottic folds and both false vocal folds were normal. Arytenoids both without edema and erythema. True vocal folds were fully mobile and without lesions. Post cricoid area was clear. Interarytenoid edema was absent. Please continue Amoxicillin three times a day for 5 days for sinus infection and Nasal saline twice a day indefinitely for dry nasal mucosa. You were found to have epistaxis, facial numbness and hearing loss. MRI head and MRV performed that were unremarkable for any thrombosis or structural defect. Given 20mg of prednisone x 2 doses. Facial numbness and lateral gaze pain resolved. Flexible laryngoscopy was performed and revealed the following: Nasopharynx had no mass or exudate. prominent adenoid tissue, No masses or obstruction. Base of tongue was symmetric and not enlarged. Vallecula was clear. Epiglottis, both aryepiglottic folds and both false vocal folds were normal. Arytenoids both without edema and erythema. True vocal folds were fully mobile and without lesions. Post cricoid area was clear. Interarytenoid edema was absent. Please continue Amoxicillin three times a day for 5 days and Nasal saline twice a day indefinitely for dry nasal mucosa. Improve Symptoms  in her 24th week; Dr Britton is her Obstetrician as an outpatient. Follow up with your Obstetrician for workup.

## 2018-04-08 NOTE — PROGRESS NOTE ADULT - SUBJECTIVE AND OBJECTIVE BOX
Late entry for earlier visit at 1830      Pt seen at bedside for PM rounds.  She reports resolution of facial numbness and states that left hearing has improved, however still reduced from baseline.  Had negative MRI/MRA in ED, admitted to neurology service for further workup of symptoms including evaluation of possible autoimmune or inflammatory process.  Results still pending.  Per neurology team also awaiting ENT consult.  Denies any headache or visual disturbance.    She has no obstetric complaints and denies any contractions, vaginal bleeding, or loss of fluid per vagina.  She endorses normal fetal movement.    ICU Vital Signs Last 24 Hrs  T(C): 37.1 (07 Apr 2018 23:04), Max: 37.3 (07 Apr 2018 14:08)  T(F): 98.7 (07 Apr 2018 23:04), Max: 99.2 (07 Apr 2018 14:08)  HR: 88 (07 Apr 2018 18:35) (85 - 108)  BP: 105/59 (07 Apr 2018 18:35) (99/56 - 125/67)  BP(mean): 65 (07 Apr 2018 18:35) (65 - 83)  ABP: --  ABP(mean): --  RR: 14 (07 Apr 2018 18:35) (14 - 22)  SpO2: 97% (07 Apr 2018 18:35) (97% - 98%)    Gen: NAD, AAOx3; resting comfortably  Pulm: normal work of breathing  Slightly decreased hearing on left side  Abd: soft, nontender, no contractions palpated  Ext: no edema or erythema, nontender bilaterally      Bedside sonogram performed: BPP 8/8, DVP 5cm
Pt seen at bedside for AM rounds.  Overnight was seen by ENT who identified left ear effusion as likely etiology of symptoms; started Amoxicillin which she will continue for five days.  Had flexible laryngoscope which was WNL.  Starting nasal spray for dry nasal mucosa.    She reports feeling well this AM and has no complaints.  Was able to sleep overnight.  Denies ctx/lof/vb, +FM.    Vital Signs Last 24 Hrs  T(C): 36.6 (08 Apr 2018 05:18), Max: 37.3 (07 Apr 2018 14:08)  T(F): 97.8 (08 Apr 2018 05:18), Max: 99.2 (07 Apr 2018 14:08)  HR: 84 (08 Apr 2018 07:00) (82 - 108)  BP: 102/65 (08 Apr 2018 07:00) (91/45 - 125/67)  BP(mean): 78 (08 Apr 2018 07:00) (60 - 83)  RR: 15 (08 Apr 2018 07:00) (14 - 16)  SpO2: 97% (08 Apr 2018 07:00) (97% - 98%)    Abd soft, nontender, no contractions  +FH on doptone
Patient seen and examined at bedside. Patient is brushing her teeth. Patient states she is "feeling better" however ear still feels "blocked". Facial numbness mildly improved.     Physical Exam:  Vital Signs Last 24 Hrs  T(C): 36.4 (07 Apr 2018 01:38), Max: 36.7 (06 Apr 2018 21:04)  T(F): 97.5 (07 Apr 2018 01:38), Max: 98 (06 Apr 2018 21:04)  HR: 85 (07 Apr 2018 01:38) (60 - 90)  BP: 115/65 (07 Apr 2018 01:38) (104/60 - 115/65)  BP(mean): 80 (07 Apr 2018 01:38) (80 - 80)  RR: 16 (07 Apr 2018 01:38) (16 - 16)  SpO2: 98% (07 Apr 2018 01:38) (98% - 99%)    GA: NAD, A+0 x 3  Abd: + BS, soft, nontender, gravid  : no vaginal bleeding, voiding freely  Extremities: no swelling or calf tenderness, reflexes +2 bilaterally                          11.7   8.2   )-----------( 216      ( 06 Apr 2018 19:59 )             33.4     04-06    134<L>  |  100  |  8   ----------------------------<  102<H>  3.7   |  24  |  0.69    Ca    8.9      06 Apr 2018 19:59    TPro  7.4  /  Alb  3.7  /  TBili  0.2  /  DBili  x   /  AST  20  /  ALT  15  /  AlkPhos  65  04-06    PT/INR - ( 06 Apr 2018 20:00 )   PT: 11.5 sec;   INR: 1.04          PTT - ( 06 Apr 2018 20:00 )  PTT:28.4 sec

## 2018-04-08 NOTE — DISCHARGE NOTE ADULT - MEDICATION SUMMARY - MEDICATIONS TO TAKE
I will START or STAY ON the medications listed below when I get home from the hospital:    Prenatal Multivitamins oral tablet (obsolete)  -- Indication: For Pregnancy    sodium chloride 0.65% nasal spray  -- 1 spray(s) into nose 3 times a day, As Needed for into nose congestion  -- Indication: For Nasal Congestion    amoxicillin 500 mg oral capsule  -- 1 cap(s) by mouth 3 times a day. Next dose on 4/9  -- Indication: For Ear effusion I will START or STAY ON the medications listed below when I get home from the hospital:    Prenatal Multivitamins oral tablet (obsolete)  -- Indication: For Pregnancy    sodium chloride 0.65% nasal spray  -- 1 spray(s) into nose 3 times a day, As Needed for into nose congestion  -- Indication: For Nasal Congestion    amoxicillin 500 mg oral capsule  -- 1 cap(s) by mouth 3 times a day. Next dose on 4/9  -- Indication: For Sinus infection

## 2018-04-08 NOTE — DISCHARGE NOTE ADULT - ADDITIONAL INSTRUCTIONS
Please continue Amoxicillin three times a day for 5 days and Nasal saline twice a day indefinitely for dry nasal mucosa.  in her 24th week; Dr Britton is her Obstetrician as an outpatient. Follow up with your Obstetrician for workup.

## 2018-04-08 NOTE — PROGRESS NOTE ADULT - ATTENDING COMMENTS
Agree with above evaluation and assessment. Patient seen and examined at bedside. Patient has no current complaints. Numbness has resolved. Patient feels better. Reports good fetal movement. Will continue abx. Appreciate care by primary team.

## 2018-04-08 NOTE — DISCHARGE NOTE ADULT - OTHER SIGNIFICANT FINDINGS
< from: MR Orbit, Face, and/or Neck No Cont, Bilat (04.06.18 @ 23:24) >    IMPRESSION:     Normal MRI examination of the orbits.    Bilateral ethmoid and maxillary sinus disease.        < end of copied text >    < from: MR Venogram Head No Cont (04.06.18 @ 23:24) >    IMPRESSION: Normal MRV examination of the brain.        < end of copied text >    < from: MR Head No Cont (04.06.18 @ 20:56) >  IMPRESSION:    No evidence of acute infarction or other acute abnormality.        < end of copied text >

## 2018-04-08 NOTE — DISCHARGE NOTE ADULT - CARE PLAN
Principal Discharge DX:	Facial paresthesia  Goal:	Improve symptoms  Assessment and plan of treatment:	You were found to have epistaxis, facial numbness and hearing loss. MRI head and MRV performed that were unremarkable for any thrombosis or structural defect. Given 20mg of prednisone x 2 doses. Facial numbness and lateral gaze pain resolved. Flexible laryngoscopy was performed and revealed the following: Nasopharynx had no mass or exudate. prominent adenoid tissue, No masses or obstruction. Base of tongue was symmetric and not enlarged. Vallecula was clear. Epiglottis, both aryepiglottic folds and both false vocal folds were normal. Arytenoids both without edema and erythema. True vocal folds were fully mobile and without lesions. Post cricoid area was clear. Interarytenoid edema was absent. Please continue Amoxicillin three times a day for 5 days for sinus infection and Nasal saline twice a day indefinitely for dry nasal mucosa.  Secondary Diagnosis:	Epistaxis  Goal:	Improve symptoms  Assessment and plan of treatment:	You were found to have epistaxis, facial numbness and hearing loss. MRI head and MRV performed that were unremarkable for any thrombosis or structural defect. Given 20mg of prednisone x 2 doses. Facial numbness and lateral gaze pain resolved. Flexible laryngoscopy was performed and revealed the following: Nasopharynx had no mass or exudate. prominent adenoid tissue, No masses or obstruction. Base of tongue was symmetric and not enlarged. Vallecula was clear. Epiglottis, both aryepiglottic folds and both false vocal folds were normal. Arytenoids both without edema and erythema. True vocal folds were fully mobile and without lesions. Post cricoid area was clear. Interarytenoid edema was absent. Please continue Amoxicillin three times a day for 5 days and Nasal saline twice a day indefinitely for dry nasal mucosa.  Secondary Diagnosis:	Pregnancy  Goal:	Improve Symptoms  Assessment and plan of treatment:	 in her 24th week; Dr Britton is her Obstetrician as an outpatient. Follow up with your Obstetrician for workup.

## 2018-04-09 LAB
DRVVT SCREEN TO CONFIRM RATIO: (no result)
LA NT DPL PPP QL: 35 SEC — SIGNIFICANT CHANGE UP

## 2018-04-10 DIAGNOSIS — H91.8X2 OTHER SPECIFIED HEARING LOSS, LEFT EAR: ICD-10-CM

## 2018-04-10 DIAGNOSIS — O99.89 OTHER SPECIFIED DISEASES AND CONDITIONS COMPLICATING PREGNANCY, CHILDBIRTH AND THE PUERPERIUM: ICD-10-CM

## 2018-04-10 DIAGNOSIS — H93.8X2 OTHER SPECIFIED DISORDERS OF LEFT EAR: ICD-10-CM

## 2018-04-10 DIAGNOSIS — H57.12 OCULAR PAIN, LEFT EYE: ICD-10-CM

## 2018-04-10 DIAGNOSIS — O99.512 DISEASES OF THE RESPIRATORY SYSTEM COMPLICATING PREGNANCY, SECOND TRIMESTER: ICD-10-CM

## 2018-04-10 DIAGNOSIS — O99.352 DISEASES OF THE NERVOUS SYSTEM COMPLICATING PREGNANCY, SECOND TRIMESTER: ICD-10-CM

## 2018-04-10 DIAGNOSIS — Z3A.26 26 WEEKS GESTATION OF PREGNANCY: ICD-10-CM

## 2018-04-10 DIAGNOSIS — Z28.21 IMMUNIZATION NOT CARRIED OUT BECAUSE OF PATIENT REFUSAL: ICD-10-CM

## 2018-04-10 DIAGNOSIS — O26.892 OTHER SPECIFIED PREGNANCY RELATED CONDITIONS, SECOND TRIMESTER: ICD-10-CM

## 2018-04-10 DIAGNOSIS — R04.0 EPISTAXIS: ICD-10-CM

## 2018-04-10 DIAGNOSIS — R20.0 ANESTHESIA OF SKIN: ICD-10-CM

## 2018-04-10 DIAGNOSIS — J32.9 CHRONIC SINUSITIS, UNSPECIFIED: ICD-10-CM

## 2018-04-10 LAB — CARDIOLIPIN AB SER-ACNC: NEGATIVE — SIGNIFICANT CHANGE UP

## 2018-07-08 ENCOUNTER — OUTPATIENT (OUTPATIENT)
Dept: OUTPATIENT SERVICES | Facility: HOSPITAL | Age: 38
LOS: 1 days | End: 2018-07-08
Payer: COMMERCIAL

## 2018-07-08 DIAGNOSIS — O26.899 OTHER SPECIFIED PREGNANCY RELATED CONDITIONS, UNSPECIFIED TRIMESTER: ICD-10-CM

## 2018-07-08 DIAGNOSIS — Z3A.00 WEEKS OF GESTATION OF PREGNANCY NOT SPECIFIED: ICD-10-CM

## 2018-07-08 PROCEDURE — 99214 OFFICE O/P EST MOD 30 MIN: CPT

## 2018-07-18 ENCOUNTER — INPATIENT (INPATIENT)
Facility: HOSPITAL | Age: 38
LOS: 2 days | Discharge: HOME | End: 2018-07-21
Attending: OBSTETRICS & GYNECOLOGY | Admitting: OBSTETRICS & GYNECOLOGY
Payer: COMMERCIAL

## 2018-07-18 VITALS
HEIGHT: 69 IN | DIASTOLIC BLOOD PRESSURE: 76 MMHG | RESPIRATION RATE: 18 BRPM | OXYGEN SATURATION: 100 % | TEMPERATURE: 98 F | WEIGHT: 161.38 LBS | HEART RATE: 94 BPM | SYSTOLIC BLOOD PRESSURE: 91 MMHG

## 2018-07-18 DIAGNOSIS — B00.9 HERPESVIRAL INFECTION, UNSPECIFIED: ICD-10-CM

## 2018-07-18 LAB
BASOPHILS NFR BLD AUTO: 0.2 % — SIGNIFICANT CHANGE UP (ref 0–2)
EOSINOPHIL NFR BLD AUTO: 1.1 % — SIGNIFICANT CHANGE UP (ref 0–6)
HCT VFR BLD CALC: 34.9 % — SIGNIFICANT CHANGE UP (ref 34.5–45)
HGB BLD-MCNC: 12.1 G/DL — SIGNIFICANT CHANGE UP (ref 11.5–15.5)
LYMPHOCYTES # BLD AUTO: 24.2 % — SIGNIFICANT CHANGE UP (ref 13–44)
MCHC RBC-ENTMCNC: 31.4 PG — SIGNIFICANT CHANGE UP (ref 27–34)
MCHC RBC-ENTMCNC: 34.7 G/DL — SIGNIFICANT CHANGE UP (ref 32–36)
MCV RBC AUTO: 90.6 FL — SIGNIFICANT CHANGE UP (ref 80–100)
MONOCYTES NFR BLD AUTO: 9.7 % — SIGNIFICANT CHANGE UP (ref 2–14)
NEUTROPHILS NFR BLD AUTO: 64.8 % — SIGNIFICANT CHANGE UP (ref 43–77)
PLATELET # BLD AUTO: 139 K/UL — LOW (ref 150–400)
RBC # BLD: 3.85 M/UL — SIGNIFICANT CHANGE UP (ref 3.8–5.2)
RBC # FLD: 13.1 % — SIGNIFICANT CHANGE UP (ref 10.3–16.9)
WBC # BLD: 6.5 K/UL — SIGNIFICANT CHANGE UP (ref 3.8–10.5)
WBC # FLD AUTO: 6.5 K/UL — SIGNIFICANT CHANGE UP (ref 3.8–10.5)

## 2018-07-18 PROCEDURE — 85610 PROTHROMBIN TIME: CPT

## 2018-07-18 PROCEDURE — 93005 ELECTROCARDIOGRAM TRACING: CPT

## 2018-07-18 PROCEDURE — 70551 MRI BRAIN STEM W/O DYE: CPT

## 2018-07-18 PROCEDURE — 86225 DNA ANTIBODY NATIVE: CPT

## 2018-07-18 PROCEDURE — 80053 COMPREHEN METABOLIC PANEL: CPT

## 2018-07-18 PROCEDURE — 85598 HEXAGNAL PHOSPH PLTLT NEUTRL: CPT

## 2018-07-18 PROCEDURE — 85379 FIBRIN DEGRADATION QUANT: CPT

## 2018-07-18 PROCEDURE — 86901 BLOOD TYPING SEROLOGIC RH(D): CPT

## 2018-07-18 PROCEDURE — 99291 CRITICAL CARE FIRST HOUR: CPT | Mod: 25

## 2018-07-18 PROCEDURE — 70544 MR ANGIOGRAPHY HEAD W/O DYE: CPT

## 2018-07-18 PROCEDURE — 85025 COMPLETE CBC W/AUTO DIFF WBC: CPT

## 2018-07-18 PROCEDURE — 85670 THROMBIN TIME PLASMA: CPT

## 2018-07-18 PROCEDURE — 70540 MRI ORBIT/FACE/NECK W/O DYE: CPT

## 2018-07-18 PROCEDURE — 85730 THROMBOPLASTIN TIME PARTIAL: CPT

## 2018-07-18 PROCEDURE — 86140 C-REACTIVE PROTEIN: CPT

## 2018-07-18 PROCEDURE — 36415 COLL VENOUS BLD VENIPUNCTURE: CPT

## 2018-07-18 PROCEDURE — 86900 BLOOD TYPING SEROLOGIC ABO: CPT

## 2018-07-18 PROCEDURE — 85652 RBC SED RATE AUTOMATED: CPT

## 2018-07-18 PROCEDURE — 86850 RBC ANTIBODY SCREEN: CPT

## 2018-07-18 PROCEDURE — 82962 GLUCOSE BLOOD TEST: CPT

## 2018-07-18 RX ORDER — SODIUM CHLORIDE 9 MG/ML
1000 INJECTION, SOLUTION INTRAVENOUS ONCE
Qty: 0 | Refills: 0 | Status: DISCONTINUED | OUTPATIENT
Start: 2018-07-18 | End: 2018-07-19

## 2018-07-18 RX ORDER — CITRIC ACID/SODIUM CITRATE 300-500 MG
15 SOLUTION, ORAL ORAL EVERY 4 HOURS
Qty: 0 | Refills: 0 | Status: DISCONTINUED | OUTPATIENT
Start: 2018-07-18 | End: 2018-07-19

## 2018-07-18 RX ORDER — SODIUM CHLORIDE 9 MG/ML
1000 INJECTION, SOLUTION INTRAVENOUS
Qty: 0 | Refills: 0 | Status: DISCONTINUED | OUTPATIENT
Start: 2018-07-18 | End: 2018-07-19

## 2018-07-18 RX ORDER — OXYTOCIN 10 UNIT/ML
333.33 VIAL (ML) INJECTION
Qty: 20 | Refills: 0 | Status: DISCONTINUED | OUTPATIENT
Start: 2018-07-18 | End: 2018-07-19

## 2018-07-18 RX ADMIN — SODIUM CHLORIDE 125 MILLILITER(S): 9 INJECTION, SOLUTION INTRAVENOUS at 22:45

## 2018-07-19 ENCOUNTER — RESULT REVIEW (OUTPATIENT)
Age: 38
End: 2018-07-19

## 2018-07-19 RX ORDER — ACETAMINOPHEN 500 MG
650 TABLET ORAL EVERY 6 HOURS
Qty: 0 | Refills: 0 | Status: DISCONTINUED | OUTPATIENT
Start: 2018-07-19 | End: 2018-07-21

## 2018-07-19 RX ORDER — HYDROCORTISONE 1 %
1 OINTMENT (GRAM) TOPICAL EVERY 4 HOURS
Qty: 0 | Refills: 0 | Status: DISCONTINUED | OUTPATIENT
Start: 2018-07-19 | End: 2018-07-21

## 2018-07-19 RX ORDER — OXYTOCIN 10 UNIT/ML
41.67 VIAL (ML) INJECTION
Qty: 20 | Refills: 0 | Status: DISCONTINUED | OUTPATIENT
Start: 2018-07-19 | End: 2018-07-21

## 2018-07-19 RX ORDER — MAGNESIUM HYDROXIDE 400 MG/1
30 TABLET, CHEWABLE ORAL
Qty: 0 | Refills: 0 | Status: DISCONTINUED | OUTPATIENT
Start: 2018-07-19 | End: 2018-07-21

## 2018-07-19 RX ORDER — DIPHENHYDRAMINE HCL 50 MG
25 CAPSULE ORAL EVERY 6 HOURS
Qty: 0 | Refills: 0 | Status: DISCONTINUED | OUTPATIENT
Start: 2018-07-19 | End: 2018-07-21

## 2018-07-19 RX ORDER — OXYCODONE AND ACETAMINOPHEN 5; 325 MG/1; MG/1
1 TABLET ORAL
Qty: 0 | Refills: 0 | Status: DISCONTINUED | OUTPATIENT
Start: 2018-07-19 | End: 2018-07-21

## 2018-07-19 RX ORDER — DOCUSATE SODIUM 100 MG
100 CAPSULE ORAL
Qty: 0 | Refills: 0 | Status: DISCONTINUED | OUTPATIENT
Start: 2018-07-19 | End: 2018-07-21

## 2018-07-19 RX ORDER — TETANUS TOXOID, REDUCED DIPHTHERIA TOXOID AND ACELLULAR PERTUSSIS VACCINE, ADSORBED 5; 2.5; 8; 8; 2.5 [IU]/.5ML; [IU]/.5ML; UG/.5ML; UG/.5ML; UG/.5ML
0.5 SUSPENSION INTRAMUSCULAR ONCE
Qty: 0 | Refills: 0 | Status: COMPLETED | OUTPATIENT
Start: 2018-07-19 | End: 2019-06-17

## 2018-07-19 RX ORDER — LANOLIN
1 OINTMENT (GRAM) TOPICAL EVERY 6 HOURS
Qty: 0 | Refills: 0 | Status: DISCONTINUED | OUTPATIENT
Start: 2018-07-19 | End: 2018-07-21

## 2018-07-19 RX ORDER — DIBUCAINE 1 %
1 OINTMENT (GRAM) RECTAL EVERY 4 HOURS
Qty: 0 | Refills: 0 | Status: DISCONTINUED | OUTPATIENT
Start: 2018-07-19 | End: 2018-07-21

## 2018-07-19 RX ORDER — GLYCERIN ADULT
1 SUPPOSITORY, RECTAL RECTAL AT BEDTIME
Qty: 0 | Refills: 0 | Status: DISCONTINUED | OUTPATIENT
Start: 2018-07-19 | End: 2018-07-21

## 2018-07-19 RX ORDER — OXYCODONE AND ACETAMINOPHEN 5; 325 MG/1; MG/1
2 TABLET ORAL EVERY 6 HOURS
Qty: 0 | Refills: 0 | Status: DISCONTINUED | OUTPATIENT
Start: 2018-07-19 | End: 2018-07-21

## 2018-07-19 RX ORDER — METOCLOPRAMIDE HCL 10 MG
10 TABLET ORAL ONCE
Qty: 0 | Refills: 0 | Status: DISCONTINUED | OUTPATIENT
Start: 2018-07-19 | End: 2018-07-21

## 2018-07-19 RX ORDER — AER TRAVELER 0.5 G/1
1 SOLUTION RECTAL; TOPICAL EVERY 4 HOURS
Qty: 0 | Refills: 0 | Status: DISCONTINUED | OUTPATIENT
Start: 2018-07-19 | End: 2018-07-21

## 2018-07-19 RX ORDER — IBUPROFEN 200 MG
600 TABLET ORAL EVERY 6 HOURS
Qty: 0 | Refills: 0 | Status: DISCONTINUED | OUTPATIENT
Start: 2018-07-19 | End: 2018-07-21

## 2018-07-19 RX ORDER — METOCLOPRAMIDE HCL 10 MG
10 TABLET ORAL THREE TIMES A DAY
Qty: 0 | Refills: 0 | Status: DISCONTINUED | OUTPATIENT
Start: 2018-07-19 | End: 2018-07-21

## 2018-07-19 RX ORDER — SODIUM CHLORIDE 9 MG/ML
3 INJECTION INTRAMUSCULAR; INTRAVENOUS; SUBCUTANEOUS EVERY 8 HOURS
Qty: 0 | Refills: 0 | Status: DISCONTINUED | OUTPATIENT
Start: 2018-07-19 | End: 2018-07-20

## 2018-07-19 RX ORDER — SIMETHICONE 80 MG/1
80 TABLET, CHEWABLE ORAL EVERY 6 HOURS
Qty: 0 | Refills: 0 | Status: DISCONTINUED | OUTPATIENT
Start: 2018-07-19 | End: 2018-07-21

## 2018-07-19 RX ORDER — OXYTOCIN 10 UNIT/ML
1 VIAL (ML) INJECTION
Qty: 30 | Refills: 0 | Status: DISCONTINUED | OUTPATIENT
Start: 2018-07-19 | End: 2018-07-19

## 2018-07-19 RX ORDER — PRAMOXINE HYDROCHLORIDE 150 MG/15G
1 AEROSOL, FOAM RECTAL EVERY 4 HOURS
Qty: 0 | Refills: 0 | Status: DISCONTINUED | OUTPATIENT
Start: 2018-07-19 | End: 2018-07-21

## 2018-07-19 RX ADMIN — SODIUM CHLORIDE 125 MILLILITER(S): 9 INJECTION, SOLUTION INTRAVENOUS at 14:25

## 2018-07-19 RX ADMIN — Medication 125 MILLIUNIT(S)/MIN: at 17:09

## 2018-07-19 RX ADMIN — Medication 1 MILLIUNIT(S)/MIN: at 00:30

## 2018-07-19 RX ADMIN — SODIUM CHLORIDE 3 MILLILITER(S): 9 INJECTION INTRAMUSCULAR; INTRAVENOUS; SUBCUTANEOUS at 21:18

## 2018-07-19 RX ADMIN — SODIUM CHLORIDE 125 MILLILITER(S): 9 INJECTION, SOLUTION INTRAVENOUS at 03:00

## 2018-07-20 LAB — T PALLIDUM AB TITR SER: NEGATIVE — SIGNIFICANT CHANGE UP

## 2018-07-20 RX ORDER — TETANUS TOXOID, REDUCED DIPHTHERIA TOXOID AND ACELLULAR PERTUSSIS VACCINE, ADSORBED 5; 2.5; 8; 8; 2.5 [IU]/.5ML; [IU]/.5ML; UG/.5ML; UG/.5ML; UG/.5ML
0.5 SUSPENSION INTRAMUSCULAR ONCE
Qty: 0 | Refills: 0 | Status: COMPLETED | OUTPATIENT
Start: 2018-07-20 | End: 2018-07-20

## 2018-07-20 RX ADMIN — Medication 100 MILLIGRAM(S): at 19:04

## 2018-07-20 RX ADMIN — TETANUS TOXOID, REDUCED DIPHTHERIA TOXOID AND ACELLULAR PERTUSSIS VACCINE, ADSORBED 0.5 MILLILITER(S): 5; 2.5; 8; 8; 2.5 SUSPENSION INTRAMUSCULAR at 23:29

## 2018-07-20 RX ADMIN — Medication 600 MILLIGRAM(S): at 19:04

## 2018-07-20 RX ADMIN — Medication 600 MILLIGRAM(S): at 19:59

## 2018-07-20 RX ADMIN — SODIUM CHLORIDE 3 MILLILITER(S): 9 INJECTION INTRAMUSCULAR; INTRAVENOUS; SUBCUTANEOUS at 06:00

## 2018-07-20 NOTE — PROGRESS NOTE ADULT - SUBJECTIVE AND OBJECTIVE BOX
Patient evaluated at bedside.   She denies pain.    She has been ambulating without assistance, voiding spontaneously, and is breastfeeding.    She denies HA, dizziness, chest pain, palpitations, shortness of breathe, n/v, or heavy vaginal bleeding    Physical Exam:  Vital Signs Last 24 Hrs  T(C): 36.6 (19 Jul 2018 22:45), Max: 36.6 (19 Jul 2018 22:45)  T(F): 97.9 (19 Jul 2018 22:45), Max: 97.9 (19 Jul 2018 22:45)  HR: 69 (19 Jul 2018 22:45) (67 - 107)  BP: 102/66 (19 Jul 2018 22:45) (95/60 - 121/76)  BP(mean): 80 (19 Jul 2018 20:30) (80 - 80)  RR: 16 (19 Jul 2018 22:45) (16 - 18)  SpO2: 98% (19 Jul 2018 22:45) (98% - 100%)    GA: NAD, A+0 x 3  Abd: soft, nontender, nondistended, no rebound or guarding, uterus firm at midline  : lochia WNL  Extremities: no swelling or calf tenderness                          12.1   6.5   )-----------( 139      ( 18 Jul 2018 23:16 )             34.9       MEDICATIONS  (STANDING):  diphtheria/tetanus/pertussis (acellular) Vaccine (ADAcel) 0.5 milliLiter(s) IntraMuscular once  ibuprofen  Tablet 600 milliGRAM(s) Oral every 6 hours  metoclopramide 10 milliGRAM(s) Oral three times a day  metoclopramide Injectable 10 milliGRAM(s) IV Push once  oxytocin Infusion 41.667 milliUNIT(s)/Min (125 mL/Hr) IV Continuous <Continuous>  prenatal multivitamin 1 Tablet(s) Oral daily  sodium chloride 0.9% lock flush 3 milliLiter(s) IV Push every 8 hours    MEDICATIONS  (PRN):  acetaminophen   Tablet 650 milliGRAM(s) Oral every 6 hours PRN For Temp greater than 38.5 C (101.3 F)  acetaminophen   Tablet. 650 milliGRAM(s) Oral every 6 hours PRN Mild Pain  dibucaine 1% Ointment 1 Application(s) Topical every 4 hours PRN Perineal Discomfort  diphenhydrAMINE   Capsule 25 milliGRAM(s) Oral every 6 hours PRN Itching  docusate sodium 100 milliGRAM(s) Oral two times a day PRN Stool Softening  glycerin Suppository - Adult 1 Suppository(s) Rectal at bedtime PRN Constipation  hydrocortisone 1% Cream 1 Application(s) Topical every 4 hours PRN Moderate to Severe Perineal Pain  lanolin Ointment 1 Application(s) Topical every 6 hours PRN Sore Nipples  magnesium hydroxide Suspension 30 milliLiter(s) Oral two times a day PRN Constipation  oxyCODONE    5 mG/acetaminophen 325 mG 1 Tablet(s) Oral every 3 hours PRN Moderate Pain  oxyCODONE    5 mG/acetaminophen 325 mG 2 Tablet(s) Oral every 6 hours PRN Severe Pain  pramoxine 1%/zinc 5% Cream 1 Application(s) Topical every 4 hours PRN Moderate to Severe Perineal Pain  simethicone 80 milliGRAM(s) Chew every 6 hours PRN Gas  witch hazel Pads 1 Application(s) Topical every 4 hours PRN Perineal Discomfort

## 2018-07-20 NOTE — LACTATION INITIAL EVALUATION - NS LACT CON REASON FOR REQ
multiparous mom/Pt. is a P2 at 40 wks. gestation via vaginal delivery.  Pt. denies any problems during the pregnancy.  Pt. states she  her first child for 4 months.  Pt. denies any problems with breastfeeding at this time.   Baby is skin to skin to right breast.  Baby latched easily to breast. Observed a shallow latch that was easily corrected with lowering of chin.  Baby able to maintain deep latch and maintain consistent sucking.  Reviewed with pt. to observe for early feeding cues, encourage skin to skin, breastfeed 8-12x/24 hrs., and monitor voids and stools.   Pt. does not have any questions at this time if she should need assistance she can have her nurse to contact LC.

## 2018-07-20 NOTE — PROGRESS NOTE ADULT - ASSESSMENT
A/P yo 37y  s/p , PP#1, stable  1. Pain: OPM  2. GI: Reg  3. :  Voiding  4. DVT prophylaxis: SCDs, ambulation  5. Dispo: PP#2

## 2018-07-21 ENCOUNTER — TRANSCRIPTION ENCOUNTER (OUTPATIENT)
Age: 38
End: 2018-07-21

## 2018-07-21 VITALS
DIASTOLIC BLOOD PRESSURE: 61 MMHG | SYSTOLIC BLOOD PRESSURE: 96 MMHG | OXYGEN SATURATION: 97 % | RESPIRATION RATE: 16 BRPM | HEART RATE: 85 BPM | TEMPERATURE: 98 F

## 2018-07-21 PROCEDURE — 90715 TDAP VACCINE 7 YRS/> IM: CPT

## 2018-07-21 PROCEDURE — 86780 TREPONEMA PALLIDUM: CPT

## 2018-07-21 PROCEDURE — 85025 COMPLETE CBC W/AUTO DIFF WBC: CPT

## 2018-07-21 PROCEDURE — 36415 COLL VENOUS BLD VENIPUNCTURE: CPT

## 2018-07-21 PROCEDURE — 88307 TISSUE EXAM BY PATHOLOGIST: CPT

## 2018-07-21 RX ORDER — VALACYCLOVIR 500 MG/1
0 TABLET, FILM COATED ORAL
Qty: 0 | Refills: 0 | COMMUNITY

## 2018-07-21 RX ADMIN — Medication 1 TABLET(S): at 11:42

## 2018-07-21 RX ADMIN — Medication 100 MILLIGRAM(S): at 11:42

## 2018-07-21 RX ADMIN — Medication 600 MILLIGRAM(S): at 11:42

## 2018-07-21 RX ADMIN — Medication 600 MILLIGRAM(S): at 12:15

## 2018-07-21 NOTE — DISCHARGE NOTE ADULT - HOSPITAL COURSE
pt admitted postpartum from uncomplicated vaginal delivery. Hospital course uneventful. Pt ambulating and tolerating PO at the time of discharge. VSS

## 2018-07-21 NOTE — DISCHARGE NOTE ADULT - CARE PLAN
Principal Discharge DX:	Postpartum care following vaginal delivery  Goal:	return home  Assessment and plan of treatment:	Continue breast feeding  F/U with OB doctor in 4-6 weeks   Nothing in the Vagina for 6 weeks, no tampons, no sex for 6 weeks  If you have severe headaches and/or vision changes, heavy bleeding, CP or High fever please call your provider or go to the nearest ED

## 2018-07-21 NOTE — DISCHARGE NOTE ADULT - PLAN OF CARE
return home Continue breast feeding  F/U with OB doctor in 4-6 weeks   Nothing in the Vagina for 6 weeks, no tampons, no sex for 6 weeks  If you have severe headaches and/or vision changes, heavy bleeding, CP or High fever please call your provider or go to the nearest ED

## 2018-07-21 NOTE — DISCHARGE NOTE OB - PATIENT PORTAL LINK FT
You can access the [facility] Patient Portal, offered by [facility], by registering with the following website: [email or web address]

## 2018-07-21 NOTE — DISCHARGE NOTE ADULT - PATIENT PORTAL LINK FT
You can access the [facility] Patient [facility], offered by [facility], by registering with the following website: [email or web address]

## 2018-07-21 NOTE — PROGRESS NOTE ADULT - SUBJECTIVE AND OBJECTIVE BOX
Patient evaluated at bedside.     She reports pain is well controlled with motrin. She has been ambulating without assistance, voiding spontaneously, and is breastfeeding. She denies HA, dizziness, chest pain, palpitations, shortness of breathe, n/v, heavy vaginal bleeding or perineal discomfort. She says she feels ready to go home today.    Physical Exam:  Vital Signs Last 24 Hrs  T(C): 36.7 (21 Jul 2018 05:39), Max: 36.8 (20 Jul 2018 21:32)  T(F): 98.1 (21 Jul 2018 05:39), Max: 98.2 (20 Jul 2018 21:32)  HR: 85 (21 Jul 2018 05:39) (83 - 85)  BP: 96/61 (21 Jul 2018 05:39) (96/61 - 117/68)  BP(mean): --  RR: 16 (21 Jul 2018 05:39) (16 - 18)  SpO2: 97% (21 Jul 2018 05:39) (97% - 97%)    GA: NAD, A+0 x 3  Abd: + BS, soft, nontender, nondistended, no rebound or guarding, uterus firm at midline  : lochia WNL  Extremities: no swelling or calf tenderness

## 2018-07-21 NOTE — DISCHARGE NOTE OB - CARE PROVIDER_API CALL
Megan Britton), Obstetrics and Gynecology  [facility] [place] [address]  Phone: [phone]  Fax: [phone]

## 2018-07-21 NOTE — PROGRESS NOTE ADULT - ASSESSMENT
A/P yo 37y  s/p , PP#2, stable  1. Pain: OPM  2. GI: Reg  3. :  Voiding  4. DVT prophylaxis: SCDs, ambulation  5. Dispo: PP#2

## 2018-07-21 NOTE — DISCHARGE NOTE ADULT - CARE PROVIDER_API CALL
Megan Britton), Obstetrics and Gynecology  [facility] [place] [address]  Phone: [phone]  Fax: [phone]

## 2018-07-21 NOTE — DISCHARGE NOTE OB - HOSPITAL COURSE
Pt had uncomplicated . Hospital course uneventful. Pt ambulating and tolerating PO at the time of discharge. VSS

## 2018-07-21 NOTE — DISCHARGE NOTE OB - MATERIALS PROVIDED
Vaccinations/  Immunization Record/Breastfeeding Log/Discharge Medication Information for Patients and Families Pocket Guide/[facility] Loving Screening Program/[place]ott[place] Feeding Log/Back To Sleep Handout/Birth Certificate Instructions/Shaken Baby Prevention Handout/Tdap Vaccination (VIS Pub Date: 2012)/Breastfeeding Mother’s Support Group Information/Guide to Postpartum Care/[facility] Hearing Screen Program/Breastfeeding Guide and Packet

## 2018-07-21 NOTE — DISCHARGE NOTE OB - BREAST MILK PROVIDES COLOSTRUM THAT IS HIGH IN PROTEIN
[address] [place] [address] for Pain Management      Post Procedures Instructions    *Resume Diet and Activity as tolerated. Rest for the remainder of the day. *You may fell worse before you feel better as the numbing medications wear off before the steroids take effect if used for your procedures. *Do not use affected extremity until numbness or loss of sensation has completely resolved without assistance. *DO NOT DRIVE, operate machinery/heavey equipment for 24 hours. *DO NOT DRINK ALCOHOL for 24 hours as it may interact with the sedation if you received it and also thins your blood and may cause you to bleed. *WAIT 24 hours before starting back ANY Blood thinning medications:   (Heparin, Coumadin, Warfarin, Lovenox, Plavix, Aggrenox)    *Resume Pre-Procedure Medications as prescribed except Blood Thinners unless directed by your Physician or Cardiologist.     *Avoid Hot tubs and Heating pad for 24 hours to prevent dissipation of medications, you may shower to remove bandages and remaining prep residue on the skin. * If you develop a Headache, drink plenty of fluids including beverages with caffeine (Coffee, Mt. Dew etc.) and rest.  If the headache persists longer than 24 hoursor intensifies - Please call [facility] ([facility]) [phone]      * If you are DIABETIC, check your blood sugar three times a day for the next three days, the steroids will increase your blood sugar. If your blood sugar is greater than 400 have someone drive you to the nearest [address]. * If you experience any of the following problems, call the Center for [facility] [phone] between 8:00 am - 4:30pm or After Hours [phone].     Shortness of breath    Fever of 101 F or higher    Nausea / Vomiting (not normal to you)    Increasing stiffness in the neck    Weakness or numbness in the arms or legs that is not resolving    Prolonged and increasing pain > than 4 days    ANYTHING OUT of the ORDINARY TO YOU    If YOU are experiencing a severe reaction / complication that you have never had before post procedure, call 911 or go to the nearest emergency room! All patients must have a  for transportation [place] regardless if you do or do not receive sedation. DISCHARGE SUMMARY from Nurse      PATIENT INSTRUCTIONS:    After Oral  or intravenous sedation, for 24 hours or while taking prescription Narcotics:  · Limit your activities  · Do not drive and operate hazardous machinery  · Do not make important personal or business decisions  · Do  not drink alcoholic beverages  · If you have not urinated within 8 hours after discharge, please contact your surgeon on call. Report the following to your surgeon:  · Excessive pain, swelling, redness or odor of or around the surgical area  · Temperature over 101  · Nausea and vomiting lasting longer than 4 hours or if unable to take medications  · Any signs of decreased circulation or nerve impairment to extremity: change in color, persistent  numbness, tingling, coldness or increase pain  · Any questions        What to do at Home:  Recommended activity: Activity as tolerated, NO DRIVING FOR 24 Hours post injection          *  Please give a list of your current medications to your Primary Care Provider. *  Please update this list whenever your medications are discontinued, doses are      changed, or new medications (including over-the-counter products) are added. *  Please carry medication information at all times in case of emergency situations. These are general instructions for a healthy lifestyle:    No smoking/ No tobacco products/ Avoid exposure to second hand smoke    Surgeon General's Warning:  Quitting smoking now greatly reduces serious risk to your health.     Obesity, smoking, and sedentary lifestyle greatly increases your risk for illness    A healthy diet, regular physical exercise & weight monitoring are important for maintaining a healthy lifestyle    You may be retaining fluid if you have a history of heart failure or if you experience any of the following symptoms:  Weight gain of 3 pounds or more overnight or 5 pounds in a week, increased swelling in our hands or feet or shortness of breath while lying flat in bed. Please call your doctor as soon as you notice any of these symptoms; do not wait until your next office visit. Recognize signs and symptoms of STROKE:    F-face looks uneven    A-arms unable to move or move unevenly    S-speech slurred or non-existent    T-time-call 911 as soon as signs and symptoms begin-DO NOT go       Back to bed or wait to see if you get better-TIME IS BRAIN. [employer] Activation    Thank you for requesting access to [employer]. Please follow the instructions below to securely access and download your online medical record. [employer] allows you to send messages to your doctor, view your test results, renew your prescriptions, schedule appointments, and more. How Do I Sign Up? 1. In your internet browser, go to [email or web address]  2. Click on the First Time User? Click Here link in the Sign In box. You will be redirect to the New Member Sign Up page. 3. Enter your [employer] Access Code exactly as it appears below. You will not need to use this code after youve completed the sign-up process. If you do not sign up before the expiration date, you must request a new code. [employer] Access Code: [email or web address]  Expires: 2017  7:05 AM (This is the date your [employer] access code will )    4. Enter the last four digits of your Social Security Number (xxxx) and Date of Birth (mm/dd/yyyy) as indicated and click Submit. You will be taken to the next sign-up page. 5. Create a [employer] ID. This will be your [employer] login ID and cannot be changed, so think of one that is secure and easy to remember. 6. Create a [employer] password.  You can change your password at any time. 7. Enter your Password Reset Question and Answer. This can be used at a later time if you forget your password. 8. Enter your e-mail address. You will receive e-mail notification when new information is available in [address] 9. Click Sign Up. You can now view and download portions of your medical record. 10. Click the Download Summary menu link to download a portable copy of your medical information. Additional Information    If you have questions, please visit the Frequently Asked Questions section of the [employer] website at [email or web address] Remember, [employer] is NOT to be used for urgent needs. For medical emergencies, dial 911. Statement Selected

## 2018-07-25 DIAGNOSIS — Z79.899 OTHER LONG TERM (CURRENT) DRUG THERAPY: ICD-10-CM

## 2018-07-25 DIAGNOSIS — Z34.83 ENCOUNTER FOR SUPERVISION OF OTHER NORMAL PREGNANCY, THIRD TRIMESTER: ICD-10-CM

## 2018-07-25 DIAGNOSIS — Z86.69 PERSONAL HISTORY OF OTHER DISEASES OF THE NERVOUS SYSTEM AND SENSE ORGANS: ICD-10-CM

## 2018-07-25 DIAGNOSIS — Z86.19 PERSONAL HISTORY OF OTHER INFECTIOUS AND PARASITIC DISEASES: ICD-10-CM

## 2018-07-25 DIAGNOSIS — Z3A.40 40 WEEKS GESTATION OF PREGNANCY: ICD-10-CM

## 2018-07-25 LAB — SURGICAL PATHOLOGY STUDY: SIGNIFICANT CHANGE UP

## 2018-07-26 NOTE — DISCHARGE NOTE ADULT - HOSPITAL COURSE
Warm
37y pregnant Female with acute onset L facial numbness in V1-2 distribution, pain with L lateral gaze and L HL. Given 20mg of prednisone x 2 doses. Facial numbness and lateral gaze pain resolved. Still with L HL but has improved slightly. No other acute complaints. In the setting of recurrent, intermittent, self resolving L sided epistaxis over the past 2 weeks that pt sought care of a hematologist.  Elevated ESR to 66. No white count (WBC 8.2). afebrile. Had negative MRI/MRA in ED, admitted to neurology service for further workup of symptoms including evaluation of possible autoimmune or inflammatory process.     ENT consulted. Flexible laryngoscopy was performed and revealed the following: Nasopharynx had no mass or exudate. Prominent adenoid tissue, No masses or obstruction. Base of tongue was symmetric and not enlarged. Vallecula was clear. Epiglottis, both aryepiglottic folds and both false vocal folds were normal. Arytenoids both without edema and erythema. True vocal folds were fully mobile and without lesions. Post cricoid area was clear. Interarytenoid edema was absent. Please continue Amoxicillin three times a day for 5 days and Nasal saline twice a day indefinitely for dry nasal mucosa.    Patient hemodynamically stable and ready to discharge to home with follow up instructions.

## 2020-11-09 ENCOUNTER — RESULT REVIEW (OUTPATIENT)
Age: 40
End: 2020-11-09
